# Patient Record
Sex: FEMALE | Race: AMERICAN INDIAN OR ALASKA NATIVE
[De-identification: names, ages, dates, MRNs, and addresses within clinical notes are randomized per-mention and may not be internally consistent; named-entity substitution may affect disease eponyms.]

---

## 2019-04-09 ENCOUNTER — HOSPITAL ENCOUNTER (OUTPATIENT)
Dept: HOSPITAL 5 - OR | Age: 44
Discharge: HOME | End: 2019-04-09
Attending: PLASTIC SURGERY
Payer: COMMERCIAL

## 2019-04-09 VITALS — SYSTOLIC BLOOD PRESSURE: 103 MMHG | DIASTOLIC BLOOD PRESSURE: 62 MMHG

## 2019-04-09 DIAGNOSIS — Z72.89: ICD-10-CM

## 2019-04-09 DIAGNOSIS — Z98.890: ICD-10-CM

## 2019-04-09 DIAGNOSIS — Z41.1: Primary | ICD-10-CM

## 2019-04-09 PROCEDURE — 81025 URINE PREGNANCY TEST: CPT

## 2019-04-09 PROCEDURE — 19325 BREAST AUGMENTATION W/IMPLT: CPT

## 2019-04-09 RX ADMIN — HYDROMORPHONE HYDROCHLORIDE PRN MG: 1 INJECTION, SOLUTION INTRAMUSCULAR; INTRAVENOUS; SUBCUTANEOUS at 14:30

## 2019-04-09 RX ADMIN — HYDROMORPHONE HYDROCHLORIDE PRN MG: 1 INJECTION, SOLUTION INTRAMUSCULAR; INTRAVENOUS; SUBCUTANEOUS at 14:45

## 2019-04-09 NOTE — OPERATIVE REPORT
Operative Report


Operative Report: 





Plastic Surgery Operative Report





Preoperative Diagnosis:  Unacceptable cosmetic appearance


Postoperative Diagnosis:  Same


Procedure:  Bilateral breast augmentation with silicone implants


Surgeon: Kirsten Sinclair MD


Assistant: None


Anesthesia: General


EBL:  Minimal


Specimens:  None





Indications:  This patient is a 43 year old female who presented with a 

complaint of inadequate breast volume, upper pole fullness and breast projection

after having children.  She desires breast augmentation with silicone implants 

to give her more roundness and fullness.  We discussed the benefits and risks of

the procedure as well as volume expectations and associated warranties.  Patient

opted for [place] silicone implants.  Informed consent was obtained.  Patient was

marked in the preoperative holding area.





Procedure:  After review of pertinent history and physical exam findings the 

patient was brought to the operating room and placed supine on the OR table.  

After induction of adequate general anesthesia the patient's chest was prepped 

and draped in the usual sterile surgical fashion.  To begin, the inframammary 

incisions were infiltrated with 1% lidocaine with epinephrine and a No. 15 blade

was used to make the right incision.  Electrocautery was used to carry the 

incision through the subcutaneous tissue down to pectoralis fascia.  Pectoralis 

muscle was incised and a pocket was dissected to accommodate the implant.  The 

pocket was then irrigated with triple antibiotic solution and checked for 

hemostasis.  Next, a 350cc [place] silicone smooth round high profile implant (SN

[id number]) was placed using a [place] funnel.  The exact same procedure was 

performed on the left breast and a 350cc [place] silicone smooth round high 

profile implant (SN [id number]) was placed in the submuscular pocket using a 

[place] funnel.  Satisfied with shape and position, we began a 3 layered closure 

using a 3-0 Monoderm Quill suture.  Skin incisions were then dressed with 

Dermabond and upon drying covered with abdominal pads.  Patient was then 

awakened from general anesthesia and transferred to recovery room in stable 

condition.  She tolerated the procedure well.  There were no complications and 

all sponge needle and instrument counts were correct at the end of the case.

## 2019-04-09 NOTE — ANESTHESIA DAY OF SURGERY
Anesthesia Day of Surgery





- Day of Surgery


Patient Examined: Yes


Patient H&P Reviewed: Yes


Patient is NPO: Yes


Beta Blockers: No


Cardiac Clearance: Yes


Pulmonary Clearance: Yes


[place]'s Test: N/A